# Patient Record
Sex: MALE | Race: OTHER | NOT HISPANIC OR LATINO
[De-identification: names, ages, dates, MRNs, and addresses within clinical notes are randomized per-mention and may not be internally consistent; named-entity substitution may affect disease eponyms.]

---

## 2017-02-08 ENCOUNTER — APPOINTMENT (OUTPATIENT)
Dept: OPHTHALMOLOGY | Facility: CLINIC | Age: 72
End: 2017-02-08

## 2017-02-08 ENCOUNTER — OUTPATIENT (OUTPATIENT)
Dept: OUTPATIENT SERVICES | Facility: HOSPITAL | Age: 72
LOS: 1 days | End: 2017-02-08

## 2017-02-08 DIAGNOSIS — H40.009 PREGLAUCOMA, UNSPECIFIED, UNSPECIFIED EYE: ICD-10-CM

## 2017-05-05 ENCOUNTER — OUTPATIENT (OUTPATIENT)
Dept: OUTPATIENT SERVICES | Facility: HOSPITAL | Age: 72
LOS: 1 days | End: 2017-05-05

## 2017-05-05 ENCOUNTER — APPOINTMENT (OUTPATIENT)
Dept: OPHTHALMOLOGY | Facility: CLINIC | Age: 72
End: 2017-05-05

## 2017-05-05 DIAGNOSIS — H40.009 PREGLAUCOMA, UNSPECIFIED, UNSPECIFIED EYE: ICD-10-CM

## 2017-10-17 ENCOUNTER — APPOINTMENT (OUTPATIENT)
Dept: OPHTHALMOLOGY | Facility: CLINIC | Age: 72
End: 2017-10-17

## 2017-10-24 ENCOUNTER — APPOINTMENT (OUTPATIENT)
Dept: OPHTHALMOLOGY | Facility: CLINIC | Age: 72
End: 2017-10-24

## 2019-09-24 ENCOUNTER — RESULT REVIEW (OUTPATIENT)
Age: 74
End: 2019-09-24

## 2019-09-24 ENCOUNTER — OUTPATIENT (OUTPATIENT)
Dept: OUTPATIENT SERVICES | Facility: HOSPITAL | Age: 74
LOS: 1 days | Discharge: HOME | End: 2019-09-24

## 2019-09-30 LAB — SURGICAL PATHOLOGY STUDY: SIGNIFICANT CHANGE UP

## 2020-01-07 ENCOUNTER — OUTPATIENT (OUTPATIENT)
Dept: OUTPATIENT SERVICES | Facility: HOSPITAL | Age: 75
LOS: 1 days | Discharge: HOME | End: 2020-01-07

## 2021-11-15 ENCOUNTER — APPOINTMENT (OUTPATIENT)
Dept: UROLOGY | Facility: CLINIC | Age: 76
End: 2021-11-15
Payer: MEDICARE

## 2021-11-15 VITALS
HEIGHT: 66 IN | WEIGHT: 160 LBS | DIASTOLIC BLOOD PRESSURE: 87 MMHG | SYSTOLIC BLOOD PRESSURE: 138 MMHG | HEART RATE: 77 BPM | OXYGEN SATURATION: 94 % | BODY MASS INDEX: 25.71 KG/M2 | TEMPERATURE: 98.6 F

## 2021-11-15 DIAGNOSIS — Z63.4 DISAPPEARANCE AND DEATH OF FAMILY MEMBER: ICD-10-CM

## 2021-11-15 DIAGNOSIS — Z87.891 PERSONAL HISTORY OF NICOTINE DEPENDENCE: ICD-10-CM

## 2021-11-15 PROCEDURE — 99204 OFFICE O/P NEW MOD 45 MIN: CPT

## 2021-11-15 SDOH — SOCIAL STABILITY - SOCIAL INSECURITY: DISSAPEARANCE AND DEATH OF FAMILY MEMBER: Z63.4

## 2021-11-16 ENCOUNTER — NON-APPOINTMENT (OUTPATIENT)
Age: 76
End: 2021-11-16

## 2021-11-16 LAB
APPEARANCE: CLEAR
BACTERIA: NEGATIVE
BILIRUBIN URINE: NEGATIVE
BLOOD URINE: NEGATIVE
COLOR: NORMAL
GLUCOSE QUALITATIVE U: NEGATIVE
HYALINE CASTS: 0 /LPF
KETONES URINE: NEGATIVE
LEUKOCYTE ESTERASE URINE: NEGATIVE
MICROSCOPIC-UA: NORMAL
NITRITE URINE: NEGATIVE
PH URINE: 5.5
PROTEIN URINE: NEGATIVE
PSA SERPL-MCNC: 9.68 NG/ML
RED BLOOD CELLS URINE: 1 /HPF
SPECIFIC GRAVITY URINE: 1.02
SQUAMOUS EPITHELIAL CELLS: 0 /HPF
UROBILINOGEN URINE: NORMAL
WHITE BLOOD CELLS URINE: 1 /HPF

## 2021-11-16 NOTE — HISTORY OF PRESENT ILLNESS
[FreeTextEntry1] : Dear Dr. Diehl\par \par Thank you so much for the referral to help care for your patient.\par \par Chief Complaint Elevated PSA \par Date of first visit: 11/15/2021\par \par ALLA MCMILLAN is a 76  year old man with PMHx BPH s/p urolift 2020 and ED s/p IPP who presents for elevated PSA. His PSA is 4.8 ng/mL (8/24/21). He denies prior prostate imaging. States he has had three prior negative biopsies at [facility], unsure of physician's name, but all were reportedly negative. Denies family hx of  malignancies.\par \par Complaining of urgency and some urge incontinence. Not on alpha blockers. Feels as if Urolift did not work.\par \par PSA Hx:\par 4.8 8/24/21\par 5.7 1/18/21\par 7.4 1/4/21\par 0.1 11/3/20\par 5.8 7/15/20\par 6.2 6/27/20\par \par 11/15/2021 \par IPSS NR QOL NR\par SUSANNA NR- IPP \par \par The patient denies fevers, chills, nausea and or vomiting and no unexplained weight loss.\par \par All pertinent laboratory, films and physician notes were reviewed.  Questionnaire results were discussed with patient.

## 2021-11-16 NOTE — PHYSICAL EXAM
[Urethral Meatus] : meatus normal [Penis Abnormality] : normal circumcised penis [Urinary Bladder Findings] : the bladder was normal on palpation [Scrotum] : the scrotum was normal [Testes Tenderness] : no tenderness of the testes [Testes Mass (___cm)] : there were no testicular masses [Prostate Tenderness] : the prostate was not tender [No Prostate Nodules] : no prostate nodules [Prostate Size ___ gm] : prostate size [unfilled] gm [General Appearance - Well Nourished] : well nourished [General Appearance - Well Developed] : well developed [Normal Appearance] : normal appearance [Well Groomed] : well groomed [General Appearance - In No Acute Distress] : no acute distress [Edema] : no peripheral edema [Respiration, Rhythm And Depth] : normal respiratory rhythm and effort [Exaggerated Use Of Accessory Muscles For Inspiration] : no accessory muscle use [Abdomen Soft] : soft [Abdomen Tenderness] : non-tender [Abdomen Hernia] : no hernia was discovered [Costovertebral Angle Tenderness] : no ~M costovertebral angle tenderness [Normal Station and Gait] : the gait and station were normal for the patient's age [] : no rash [No Focal Deficits] : no focal deficits [Oriented To Time, Place, And Person] : oriented to person, place, and time [Affect] : the affect was normal [Mood] : the mood was normal [Not Anxious] : not anxious [No Palpable Adenopathy] : no palpable adenopathy [FreeTextEntry1] : IPP

## 2021-11-16 NOTE — ASSESSMENT
[FreeTextEntry1] : 75 yo male with elevated PSA 4.8 ng/mL, no prior MRI, negative biopsy x3, neg FH, neg RAMAKRISHNA.\par \par 1. PSA\par 2. Urinary urgency- UA, UCx\par 3. Discussed timed voiding to avoid urge incontinence\par 4. MRI at [facility]\par \par Follow up after MRI\par \par Thank you very much for allowing me to assist in the care of this patient. Should you have any additional questions or concerns please do not hesitate to contact me.\par \par \par Sincerely,\par \par \par Ankit Davis D.O.\par  of Urology and Radiology\par  of Urology at [facility]\par System Director for Prostate Cancer\par [address] [place] [address], 5th Floor [facility], [facility]\par Phone: [phone]\par

## 2021-11-17 LAB — BACTERIA UR CULT: NORMAL

## 2021-11-26 ENCOUNTER — OUTPATIENT (OUTPATIENT)
Dept: OUTPATIENT SERVICES | Facility: HOSPITAL | Age: 76
LOS: 1 days | End: 2021-11-26
Payer: MEDICARE

## 2021-11-26 ENCOUNTER — RESULT REVIEW (OUTPATIENT)
Age: 76
End: 2021-11-26

## 2021-11-26 ENCOUNTER — APPOINTMENT (OUTPATIENT)
Dept: MRI IMAGING | Facility: IMAGING CENTER | Age: 76
End: 2021-11-26
Payer: MEDICARE

## 2021-11-26 DIAGNOSIS — R97.20 ELEVATED PROSTATE SPECIFIC ANTIGEN [PSA]: ICD-10-CM

## 2021-11-26 PROCEDURE — 72197 MRI PELVIS W/O & W/DYE: CPT | Mod: 26,MG

## 2021-11-26 PROCEDURE — 76498 UNLISTED MR PROCEDURE: CPT

## 2021-11-26 PROCEDURE — 76498P: CUSTOM | Mod: 26,MH

## 2021-11-26 PROCEDURE — G1004: CPT

## 2021-11-26 PROCEDURE — 72197 MRI PELVIS W/O & W/DYE: CPT | Mod: MG

## 2021-12-01 ENCOUNTER — NON-APPOINTMENT (OUTPATIENT)
Age: 76
End: 2021-12-01

## 2021-12-08 ENCOUNTER — APPOINTMENT (OUTPATIENT)
Dept: UROLOGY | Facility: CLINIC | Age: 76
End: 2021-12-08
Payer: MEDICARE

## 2021-12-08 VITALS
TEMPERATURE: 98.1 F | HEART RATE: 77 BPM | WEIGHT: 160 LBS | HEIGHT: 66 IN | DIASTOLIC BLOOD PRESSURE: 88 MMHG | BODY MASS INDEX: 25.71 KG/M2 | SYSTOLIC BLOOD PRESSURE: 166 MMHG

## 2021-12-08 PROCEDURE — 99214 OFFICE O/P EST MOD 30 MIN: CPT

## 2021-12-08 NOTE — PHYSICAL EXAM
[General Appearance - Well Developed] : well developed [General Appearance - Well Nourished] : well nourished [Normal Appearance] : normal appearance [Well Groomed] : well groomed [General Appearance - In No Acute Distress] : no acute distress [Edema] : no peripheral edema [Respiration, Rhythm And Depth] : normal respiratory rhythm and effort [Exaggerated Use Of Accessory Muscles For Inspiration] : no accessory muscle use [Abdomen Soft] : soft [Abdomen Tenderness] : non-tender [Abdomen Hernia] : no hernia was discovered [Costovertebral Angle Tenderness] : no ~M costovertebral angle tenderness [Urethral Meatus] : meatus normal [Penis Abnormality] : normal circumcised penis [Urinary Bladder Findings] : the bladder was normal on palpation [Scrotum] : the scrotum was normal [Testes Tenderness] : no tenderness of the testes [Testes Mass (___cm)] : there were no testicular masses [Prostate Tenderness] : the prostate was not tender [No Prostate Nodules] : no prostate nodules [Prostate Size ___ gm] : prostate size [unfilled] gm [Normal Station and Gait] : the gait and station were normal for the patient's age [] : no rash [No Focal Deficits] : no focal deficits [Oriented To Time, Place, And Person] : oriented to person, place, and time [Affect] : the affect was normal [Mood] : the mood was normal [Not Anxious] : not anxious [No Palpable Adenopathy] : no palpable adenopathy [FreeTextEntry1] : IPP, neg RAMAKRISHNA 11/16/21

## 2021-12-08 NOTE — ASSESSMENT
[FreeTextEntry1] : 77 yo male with elevated PSA 4.8 ng/mL, no prior MRI, negative biopsy x3, neg FH, neg RAMAKRISHNA.  MRI demonstrated no sig lesions.\par \par The prostate MRI has been reviewed with the patient (images and report).  There are no suspicious lesions on 2nd look.  Reviewing multiple studies the probability of having prostate cancer with a negative prostate MRI is ~ 20%.  However, the probability of having clinically significant disease is <5% of those positive.  A recent study from [facility] presented level 1b evidence that a MRI can help avoid patients biopsies and only misses ~ 3% of clinically significant disease.  \par \par I have discussed these details at length with the patient.  He is aware of the pro's and con's of prostate cancer screening.  Taking into account his PSA, Family History and RAMAKRISHNA findings.  He wishes at this time to avoid a biopsy and will continue to undergo PSA based screening. If the PSA continues to increase or there is increasing clinical suspicion we will repeat MR imaging of the prostate prior to any intervention, due to risks associated with the prostate biopsy. The patient understands that a prostate biopsy is still the standard of care with regards to screening and MRI is an adjunct that can help localize and target more suspicious areas.  In conclusion, he would like to hold off on the biopsy at this time.\par \par 1. discuss with Dr. Diehl about urolift removal due to REJ.  This may not help the problem the patient is aware.\par 2. Urinary urgency no signs of infection - PVR 90 cc - aware of risks of retention, dry mouth.\par 3. Discussed timed voiding to avoid urge incontinence as well as adding oxybutynin 5ER\par 4. MRI at [facility] in 2022 1 year interval if PSA stays elevated.\par 5. 1 month follow up PVr and flow\par \par Thank you very much for allowing me to assist in the care of this patient. Should you have any additional questions or concerns please do not hesitate to contact me.\par \par \par Sincerely,\par \par \par Ankit Davis D.O.\par  of Urology and Radiology\par  of Urology at [facility]\par System Director for Prostate Cancer\par [address], 5[address] Floor [facility], [address]\par Phone: [phone]\par

## 2021-12-09 ENCOUNTER — NON-APPOINTMENT (OUTPATIENT)
Age: 76
End: 2021-12-09

## 2021-12-09 LAB
PSA FREE FLD-MCNC: 12 %
PSA FREE SERPL-MCNC: 0.9 NG/ML
PSA SERPL-MCNC: 7.79 NG/ML

## 2022-01-10 ENCOUNTER — APPOINTMENT (OUTPATIENT)
Dept: UROLOGY | Facility: CLINIC | Age: 77
End: 2022-01-10
Payer: MEDICARE

## 2022-01-10 PROCEDURE — 99213 OFFICE O/P EST LOW 20 MIN: CPT

## 2022-01-10 RX ORDER — OXYBUTYNIN CHLORIDE 5 MG/1
5 TABLET, EXTENDED RELEASE ORAL DAILY
Qty: 90 | Refills: 0 | Status: COMPLETED | COMMUNITY
Start: 2021-12-08 | End: 2022-01-10

## 2022-01-10 NOTE — HISTORY OF PRESENT ILLNESS
[FreeTextEntry1] : Dear Dr. Diehl\par \par Thank you so much for the referral to help care for your patient.\par \par Chief Complaint Urinary Urgency, elevated PSA\par Date of first visit: 11/15/2021\par \par ALLA MCMILLAN is a 76  year old man with PMHx BPH s/p urolift 2020 and ED s/p IPP who presents for elevated PSA and BPH. His PSA is 7.7 ng/mL. MRI on 11/26/21 read as negative but limited due to artifact from urolift. His PSA density is normal 0.10 ng/ml/cc. States he has had three prior negative biopsies at [facility], unsure of physician's name, but all were reportedly negative. Denies family hx of  malignancies.\par \par He is primary focused on REJ associated with his BPH treatment. He reports anejaculation. Does experience sensation of incomplete emptying and varying FOS. He was complaining of urgency and hesitancy at last office visit and was prescribed oxybutynin. He never started and feels these symptoms have subsided. Not on alpha blockers but has tried Flomax in the past.  Feels as if Urolift did not work and he is interested in PAE. Denies hx of recurrent UTI, bladder stones, hematuria, kidney failure. He never started oxybutynin and stopped his prostate supplements. \par \par PSA Hx:\par 7.79 12/8/21. PSAD 0.10 ng/ml/cc\par 9.68 11/15/21\par 4.8 8/24/21\par 5.7 1/18/21\par 7.4 1/4/21\par 0.1 11/3/20\par 5.8 7/15/20\par 6.2 6/27/20\par \par MRI at [facility] on 11/26/2021. 77 cc prostate with no suspicious prostate lesion, PIRADS 1. Artifact from urolift device limits evaluation of periurethral transition zone. No LAD, No EPE, No Bony Lesions. The images have been reviewed and clinical implications discussed with the patient.\par The anterior part of the gland exam was limited due to urolift but DCE demonstrated no abnormal enhancement therefore i do agree with the read.\par \par 01/10/2022\par IPSS 15  QOL 3\par SUSANNA NR\par Flow/PVR: machine malfunction, PVR 29ml\par \par 12/8/2021\par IPSS 19 QOL 6\par SUSANNA\par The PVR was 90 cc.  QMax 8 ml/s VV was 75. \par \par 11/15/2021 \par IPSS NR QOL NR\par SUSANNA NR- IPP \par \par Prostate cancer screening: the patient and I spoke at length about prostate cancer screening, its risks and its benefits. The patient has (Age, PSA) 2 risk factors for prostate cancer.  He understands that many men with prostate cancer will die with the disease rather than of it and we also discussed the results large multi-center American and  prostate cancer screening trials. He also understands that PSA in and of itself does not diagnose prostate cancer but only assesses risk to a certain degree. The patient understands that to definitively screen for prostate cancer, a biopsy is required and this procedure has risks, including bleeding, infection, ED and urinary retention. The patient opted to hold off on an biopsy, trend PSA and reimage at 1 year.\par \par The patient denies fevers, chills, nausea and or vomiting and no unexplained weight loss.\par \par All pertinent laboratory, films and physician notes were reviewed.  Questionnaire results were discussed with patient.\par \par I discussed with the patient and reviewed the risks and benefits and alternatives to prostate artery embolization. Time was spent with the patient discussing alternative therapies including TURP, urolift, medical therapy, laser, HoLEP, SPP and embolization. \par \par We discussed the early result and success of the procedure in general as well as my personal results. Specifically, reviewed risk related to nontarget embolization most commonly involving the bladder and/or rectum. We also spoke about the potential for post procedure obstruction required chase catheter drainage. We reviewed some technical aspects up of the procedure including embolic material utilizing the importance of cone beam CT prior to embolization. The patient wishes procedure scheduling a procedure.\par \par Specific risks of the embolization procedure which were discussed with the patient including infection, bleeding, dysuria, hematuria, hematospermia, non-target embolization which would result in damage to bladder wall leading to ulceration/ischemia, rectal wall injury, and injury to penis and ejaculatory mechanism. These risks are considered to be low. Pelvic pain and burning is not uncommon and considered to be mild and transient. The patient understands that a Chase catheter may be inserted during the procedure to help guide embolization and will be removed at the end of the procedure. A small percentage of patients will have some degree of urinary retention after embolization and will require a catheter to be left in. He understands this. We also discussed that long-term results of prostate embolization are unknown but response rates in reduction of his IPSS score are 80-85% based on current literature and state of the art techniques. \par \par It was explained to the patient that approximate 5% patient experienced some degree bruising following femoral or radial artery catheterization. The hematoma is benign and resolves spontaneously under typical circumstances. The specific benefits and risks of transradial (TR) access versus transfemoral (TF) access were discussed in detail with the patient. This includes decreased risk of bleeding, immediate ambulation and faster discharge time. Potential increased and extremely remote risk of cerebral emboli was discussed. The patient was given information packet with further details.

## 2022-01-10 NOTE — ASSESSMENT
[FreeTextEntry1] : 75 yo male with elevated PSA 7.7 ng/mL, MRI 11/26/21 negative- The anterior part of the gland exam was limited due to urolift but DCE demonstrated no abnormal enhancement therefore i do agree with the read. Negative biopsy x3, neg FH, neg RAMAKRISHNA.  MRI demonstrated no sig lesions. He has failed medical therapy currently on no alpha blockers/supplements and is now interested in PAE before his symptoms worsen.\par \par 3.4mm left radial artery\par Barbau D- needs groin access without a catheter\par \par 1. Book for PAE - GROIN access without a Chase (77 g prostate)\par 2. Preop labs 1/10/22\par 3. Medical clearance with EKG\par 4. MRI at [facility] in 2022 1 year interval if PSA stays elevated. (due 11/2022)\par \par 1 month postop follow up with Flow/PVR\par \par Thank you very much for allowing me to assist in the care of this patient. Should you have any additional questions or concerns please do not hesitate to contact me.\par \par \par Sincerely,\par \par \par Ankit Davis D.O.\par  of Urology and Radiology\par  of Urology at [facility]\par System Director for Prostate Cancer\par [address], 5[address] Floor [facility], [facility]\par Phone: [phone]\par

## 2022-01-10 NOTE — PHYSICAL EXAM

## 2022-01-11 LAB
ANION GAP SERPL CALC-SCNC: 11 MMOL/L
APPEARANCE: CLEAR
BACTERIA: NEGATIVE
BASOPHILS # BLD AUTO: 0.05 K/UL
BASOPHILS NFR BLD AUTO: 0.8 %
BILIRUBIN URINE: NEGATIVE
BLOOD URINE: NEGATIVE
BUN SERPL-MCNC: 11 MG/DL
CALCIUM SERPL-MCNC: 9.3 MG/DL
CHLORIDE SERPL-SCNC: 106 MMOL/L
CO2 SERPL-SCNC: 25 MMOL/L
COLOR: NORMAL
CREAT SERPL-MCNC: 0.96 MG/DL
EOSINOPHIL # BLD AUTO: 0.28 K/UL
EOSINOPHIL NFR BLD AUTO: 4.5 %
GLUCOSE QUALITATIVE U: NEGATIVE
GLUCOSE SERPL-MCNC: 90 MG/DL
HCT VFR BLD CALC: 46.8 %
HGB BLD-MCNC: 14.8 G/DL
HYALINE CASTS: 1 /LPF
IMM GRANULOCYTES NFR BLD AUTO: 0.3 %
KETONES URINE: NEGATIVE
LEUKOCYTE ESTERASE URINE: NEGATIVE
LYMPHOCYTES # BLD AUTO: 1.86 K/UL
LYMPHOCYTES NFR BLD AUTO: 30.1 %
MAN DIFF?: NORMAL
MCHC RBC-ENTMCNC: 29.1 PG
MCHC RBC-ENTMCNC: 31.6 GM/DL
MCV RBC AUTO: 92.1 FL
MICROSCOPIC-UA: NORMAL
MONOCYTES # BLD AUTO: 0.58 K/UL
MONOCYTES NFR BLD AUTO: 9.4 %
NEUTROPHILS # BLD AUTO: 3.39 K/UL
NEUTROPHILS NFR BLD AUTO: 54.9 %
NITRITE URINE: NEGATIVE
PH URINE: 6
PLATELET # BLD AUTO: 251 K/UL
POTASSIUM SERPL-SCNC: 4.4 MMOL/L
PROTEIN URINE: NEGATIVE
RBC # BLD: 5.08 M/UL
RBC # FLD: 13.9 %
RED BLOOD CELLS URINE: 2 /HPF
SODIUM SERPL-SCNC: 142 MMOL/L
SPECIFIC GRAVITY URINE: 1.01
SQUAMOUS EPITHELIAL CELLS: 0 /HPF
UROBILINOGEN URINE: NORMAL
WBC # FLD AUTO: 6.18 K/UL
WHITE BLOOD CELLS URINE: 1 /HPF

## 2022-01-13 LAB — BACTERIA UR CULT: NORMAL

## 2022-01-20 ENCOUNTER — NON-APPOINTMENT (OUTPATIENT)
Age: 77
End: 2022-01-20

## 2022-01-25 ENCOUNTER — APPOINTMENT (OUTPATIENT)
Dept: INTERVENTIONAL RADIOLOGY/VASCULAR | Facility: HOSPITAL | Age: 77
End: 2022-01-25

## 2022-01-25 ENCOUNTER — RESULT REVIEW (OUTPATIENT)
Age: 77
End: 2022-01-25

## 2022-01-25 ENCOUNTER — APPOINTMENT (OUTPATIENT)
Dept: UROLOGY | Facility: HOSPITAL | Age: 77
End: 2022-01-25

## 2022-01-25 ENCOUNTER — OUTPATIENT (OUTPATIENT)
Dept: OUTPATIENT SERVICES | Facility: HOSPITAL | Age: 77
LOS: 1 days | End: 2022-01-25
Payer: MEDICARE

## 2022-01-25 PROCEDURE — C1894: CPT

## 2022-01-25 PROCEDURE — 76380 CAT SCAN FOLLOW-UP STUDY: CPT | Mod: 59

## 2022-01-25 PROCEDURE — 76377 3D RENDER W/INTRP POSTPROCES: CPT

## 2022-01-25 PROCEDURE — 76377 3D RENDER W/INTRP POSTPROCES: CPT | Mod: 26

## 2022-01-25 PROCEDURE — C1760: CPT

## 2022-01-25 PROCEDURE — 76937 US GUIDE VASCULAR ACCESS: CPT

## 2022-01-25 PROCEDURE — 37243 VASC EMBOLIZE/OCCLUDE ORGAN: CPT

## 2022-01-25 PROCEDURE — 76937 US GUIDE VASCULAR ACCESS: CPT | Mod: 26

## 2022-01-25 PROCEDURE — 36247 INS CATH ABD/L-EXT ART 3RD: CPT

## 2022-01-25 PROCEDURE — C1889: CPT

## 2022-01-25 PROCEDURE — C1887: CPT

## 2022-01-25 PROCEDURE — C1769: CPT

## 2022-01-25 PROCEDURE — 76380 CAT SCAN FOLLOW-UP STUDY: CPT | Mod: 26,59,MH

## 2022-01-26 ENCOUNTER — NON-APPOINTMENT (OUTPATIENT)
Age: 77
End: 2022-01-26

## 2022-02-01 ENCOUNTER — NON-APPOINTMENT (OUTPATIENT)
Age: 77
End: 2022-02-01

## 2022-02-14 ENCOUNTER — APPOINTMENT (OUTPATIENT)
Dept: UROLOGY | Facility: CLINIC | Age: 77
End: 2022-02-14
Payer: MEDICARE

## 2022-02-14 VITALS — DIASTOLIC BLOOD PRESSURE: 92 MMHG | HEART RATE: 66 BPM | SYSTOLIC BLOOD PRESSURE: 159 MMHG | TEMPERATURE: 97.4 F

## 2022-02-14 PROCEDURE — 99213 OFFICE O/P EST LOW 20 MIN: CPT

## 2022-02-14 NOTE — PHYSICAL EXAM

## 2022-02-14 NOTE — HISTORY OF PRESENT ILLNESS
[FreeTextEntry1] : Dear Dr. Diehl\par \par Thank you so much for the referral to help care for your patient.\par \par Chief Complaint: BPH, elevated PSA\par Date of first visit: 11/15/2021\par \par ALLA MCMILLAN is a 76  year old man with PMHx BPH s/p urolift 2020 and ED s/p IPP who presents for elevated PSA and BPH. His PSA is 7.7 ng/mL. MRI on 11/26/21 read as negative but limited due to artifact from urolift. His PSA density is normal 0.10 ng/ml/cc. States he has had three prior negative biopsies at [facility], unsure of physician's name, but all were reportedly negative. Denies family hx of  malignancies.\par \par He is s/p bilateral PAE with coils on 1/25/22. He is happy but still complaining of sensation of incomplete emptying/double voiding. Urgency/urge incontinence has resolved and he is happy even though only 3 weeks postop. \par \par He is primary focused on REJ associated with his BPH treatment. He reports anejaculation. Does experience sensation of incomplete emptying and varying FOS. He was complaining of urgency and hesitancy at last office visit and was prescribed oxybutynin. He never started and feels these symptoms have subsided. Not on alpha blockers but has tried Flomax in the past.  Feels as if Urolift did not work and he is interested in PAE. Denies hx of recurrent UTI, bladder stones, hematuria, kidney failure. He never started oxybutynin and stopped his prostate supplements. \par \par PSA Hx:\par 7.79 12/8/21. PSAD 0.10 ng/ml/cc\par 9.68 11/15/21\par 4.8 8/24/21\par 5.7 1/18/21\par 7.4 1/4/21\par 0.1 11/3/20\par 5.8 7/15/20\par 6.2 6/27/20\par \par MRI at [facility] on 11/26/2021. 77 cc prostate with no suspicious prostate lesion, PIRADS 1. Artifact from urolift device limits evaluation of periurethral transition zone. No LAD, No EPE, No Bony Lesions. The images have been reviewed and clinical implications discussed with the patient.\par The anterior part of the gland exam was limited due to urolift but DCE demonstrated no abnormal enhancement therefore i do agree with the read.\par \par 02/14/2022\par IPSS  10 QOL 3\par SUSANNA NSA\par Flow/PVR: peak 8.8 ml/s, avg 5.6 ml/s, vv 63 ml-not valid, PVR 83\par \par 01/10/2022\par IPSS 15  QOL 3\par SUSANNA NR\par Flow/PVR: machine malfunction, PVR 29ml\par \par 12/8/2021\par IPSS 19 QOL 6\par SUSANNA\par The PVR was 90 cc.  QMax 8 ml/s VV was 75. \par \par 11/15/2021 \par IPSS NR QOL NR\par SUSANNA NR- IPP \par \par Prostate cancer screening: the patient and I spoke at length about prostate cancer screening, its risks and its benefits. The patient has (Age, PSA) 2 risk factors for prostate cancer.  He understands that many men with prostate cancer will die with the disease rather than of it and we also discussed the results large multi-center American and  prostate cancer screening trials. He also understands that PSA in and of itself does not diagnose prostate cancer but only assesses risk to a certain degree. The patient understands that to definitively screen for prostate cancer, a biopsy is required and this procedure has risks, including bleeding, infection, ED and urinary retention. The patient opted to hold off on an biopsy, trend PSA and reimage at 1 year.\par \par The patient denies fevers, chills, nausea and or vomiting and no unexplained weight loss.\par \par All pertinent laboratory, films and physician notes were reviewed.  Questionnaire results were discussed with patient.\par

## 2022-05-09 ENCOUNTER — APPOINTMENT (OUTPATIENT)
Dept: UROLOGY | Facility: CLINIC | Age: 77
End: 2022-05-09
Payer: MEDICARE

## 2022-05-09 VITALS
OXYGEN SATURATION: 94 % | DIASTOLIC BLOOD PRESSURE: 78 MMHG | HEART RATE: 70 BPM | SYSTOLIC BLOOD PRESSURE: 154 MMHG | TEMPERATURE: 98 F

## 2022-05-09 PROCEDURE — 99213 OFFICE O/P EST LOW 20 MIN: CPT

## 2022-05-09 NOTE — HISTORY OF PRESENT ILLNESS
[FreeTextEntry1] : Dear Dr. Diehl\par \par Thank you so much for the referral to help care for your patient.\par \par Chief Complaint: BPH, elevated PSA\par Date of first visit: 11/15/2021\par \par ALLA MCMILLAN is a 77 year old man with PMHx BPH s/p urolift 2020 and ED s/p IPP who presents for evaluation of elevated PSA and BPH. His PSA was 7.7 ng/mL(12/2021). MRI on 11/26/21 read as negative but limited due to artifact from urolift. His PSA density is normal (0.10 ng/ml/cc). States he has had three prior negative biopsies at [facility], unsure of physician's name, but all were reportedly negative. Denies family hx of  malignancies.\par \par He is doing well s/p bilateral PAE with coils 01/2022, states the procedure "was a miracle". \par Denies frequency and urgency. He does have the occasional sensation of incomplete emptying and occasional need for double voiding. Nocturia x 1-2. He denies hematuria and dysuria. \par \par PSA Hx:\par 7.79 12/8/21. PSAD 0.10 ng/ml/cc\par 9.68 11/15/21\par 4.8 8/24/21\par 5.7 1/18/21\par 7.4 1/4/21\par 0.1 11/3/20\par 5.8 7/15/20\par 6.2 6/27/20\par \par MRI at [facility] on 11/26/2021. 77 cc prostate with no suspicious prostate lesion, PIRADS 1. Artifact from urolift device limits evaluation of periurethral transition zone. No LAD, No EPE, No Bony Lesions. The images have been reviewed and clinical implications discussed with the patient.\par The anterior part of the gland exam was limited due to urolift but DCE demonstrated no abnormal enhancement therefore i do agree with the read.\par \par 05/09/2022\par IPSS 4   QOL 1\par SUSANNA NR\par FlowPVR- VV insufficient- 11.8 ml PVR= 69 ml\par \par 02/14/2022\par IPSS  10 QOL 3\par SUSANNA NSA\par Flow/PVR: peak 8.8 ml/s, avg 5.6 ml/s, vv 63 ml-not valid, PVR 83\par \par 01/10/2022\par IPSS 15  QOL 3\par SUSANNA NR\par Flow/PVR: machine malfunction, PVR 29ml\par \par 12/8/2021\par IPSS 19 QOL 6\par SUSANNA\par The PVR was 90 cc.  QMax 8 ml/s VV was 75. \par \par 11/15/2021 \par IPSS NR QOL NR\par SUSANNA NR- IPP \par \par Prostate cancer screening: the patient and I spoke at length about prostate cancer screening, its risks and its benefits. The patient has (Age, PSA) 2 risk factors for prostate cancer.  He understands that many men with prostate cancer will die with the disease rather than of it and we also discussed the results large multi-center American and  prostate cancer screening trials. He also understands that PSA in and of itself does not diagnose prostate cancer but only assesses risk to a certain degree. The patient understands that to definitively screen for prostate cancer, a biopsy is required and this procedure has risks, including bleeding, infection, ED and urinary retention. The patient opted to trend PSA yearly.\par \par The patient denies fevers, chills, nausea and or vomiting and no unexplained weight loss.\par \par All pertinent laboratory results and physician notes were reviewed.  Questionnaire results were discussed with patient.\par

## 2022-05-09 NOTE — ASSESSMENT
[FreeTextEntry1] : 77 year old male 4 months s/p bilateral PAE with coils. He has an elevated PSA 7.7 ng/mL, MRI 11/26/21 negative- The anterior part of the gland exam was limited due to urolift but DCE demonstrated no abnormal enhancement therefore i do agree with the read. Normal PSAD, negative biopsy x3, neg FH, neg RAMAKRISHNA. MRI demonstrated no sig lesions. Progressing well post op.\par \par 1. RTC in 6 months with FlowPVR\par 2. IPSS 10 --> , PVR stable, 69 ml\par 3. MRI at [facility] due 11/2022\par \par \par Thank you very much for allowing me to assist in the care of this patient. Should you have any additional questions or concerns please do not hesitate to contact me.\par \par \par Sincerely,\par \par \par Ankit Davis D.O.\par  of Urology and Radiology\par  of Urology at [facility]\par System Director for Prostate Cancer\par [address] [place] [address], 5th Floor [facility], [facility]\par Phone: [phone]\par

## 2022-05-09 NOTE — ADDENDUM
[FreeTextEntry1] : I, Dr. Davis, personally performed the evaluation and management (E/M) services for this established patient who presents today with (a) new problem(s)/exacerbation of (an) existing condition(s).  That E/M includes conducting the examination, assessing all new/exacerbated conditions, and establishing a new plan of care.  Today, my ACP, Pam Alford NP, was here to observe my evaluation and management services for this new problem/exacerbated condition to be followed going forward\par

## 2022-05-11 ENCOUNTER — APPOINTMENT (OUTPATIENT)
Dept: UROLOGY | Facility: CLINIC | Age: 77
End: 2022-05-11

## 2022-11-14 ENCOUNTER — APPOINTMENT (OUTPATIENT)
Dept: UROLOGY | Facility: CLINIC | Age: 77
End: 2022-11-14

## 2022-11-14 VITALS — HEART RATE: 69 BPM | SYSTOLIC BLOOD PRESSURE: 123 MMHG | DIASTOLIC BLOOD PRESSURE: 54 MMHG | TEMPERATURE: 98 F

## 2022-11-14 PROCEDURE — 99213 OFFICE O/P EST LOW 20 MIN: CPT

## 2022-11-14 NOTE — ASSESSMENT
[FreeTextEntry1] : 77 year old male 4 months s/p bilateral PAE with coils. He has an elevated PSA 7.7 ng/mL, MRI 11/26/21 negative- The anterior part of the gland exam was limited due to urolift but DCE demonstrated no abnormal enhancement therefore i do agree with the read. Normal PSAD, negative biopsy x3, neg FH, neg RAMAKRISHNA. MRI demonstrated no sig lesions. Progressing well post op.\par \par Elevated PSA\par - PSA today\par - Prostate MRI ordered\par - RTC Dec 13th to review MRI \par \par BPH/LUTS s/p Urolift (2020) and then b/l PAE (2022)\par -  Flow/PVR in 6 months\par - Pt is currently happy with his voiding sx and does not wish to start any medication due to apprehension regarding side effects\par \par Erectile Dysfunction/Decrease libido\par - IPP is functioning well\par - Will check Testosterone today - if returns low, would recommend drawn AM level before initiating any treatment\par \par Thank you very much for allowing me to assist in the care of this patient. Should you have any additional questions or concerns please do not hesitate to contact me.\par \par \par Sincerely,\par \par \par Ankit Davis D.O.\par  of Urology and Radiology\par  of Urology at [facility]\par System Director for Prostate Cancer\par [address], 5[address] Floor [facility], [facility]\par Phone: [phone]\par

## 2022-11-14 NOTE — PHYSICAL EXAM
[General Appearance - Well Developed] : well developed [General Appearance - Well Nourished] : well nourished [Normal Appearance] : normal appearance [Well Groomed] : well groomed [General Appearance - In No Acute Distress] : no acute distress [Abdomen Soft] : soft [Abdomen Tenderness] : non-tender [Abdomen Mass (___ Cm)] : no abdominal mass palpated [Costovertebral Angle Tenderness] : no ~M costovertebral angle tenderness [Urethral Meatus] : meatus normal [Penis Abnormality] : normal circumcised penis [Scrotum] : the scrotum was normal [Testes Tenderness] : no tenderness of the testes [Testes Mass (___cm)] : there were no testicular masses [Prostate Enlargement] : the prostate was not enlarged [Prostate Tenderness] : the prostate was not tender [Skin Color & Pigmentation] : normal skin color and pigmentation [Edema] : no peripheral edema [] : no respiratory distress [Exaggerated Use Of Accessory Muscles For Inspiration] : no accessory muscle use [Oriented To Time, Place, And Person] : oriented to person, place, and time [Affect] : the affect was normal [Mood] : the mood was normal [Not Anxious] : not anxious [Normal Station and Gait] : the gait and station were normal for the patient's age [No Focal Deficits] : no focal deficits [FreeTextEntry1] : Deflated IPP palpated, no erosion noted.  RAMAKRISHNA 11/14/22 R-sided firmness, but not nodules

## 2022-11-14 NOTE — HISTORY OF PRESENT ILLNESS
[FreeTextEntry1] : Dear Dr. Diehl\par \par Thank you so much for the referral to help care for your patient.\par \par Chief Complaint: BPH, elevated PSA\par Date of first visit: 11/15/2021\par \par ALLA MCMILLAN is a 77 year old man with PMHx BPH s/p urolift 2020 and ED s/p IPP who presents for evaluation of elevated PSA and BPH. His PSA was 7.7 ng/mL(12/2021). MRI on 11/26/21 read as negative but limited due to artifact from urolift. His PSA density is normal (0.10 ng/ml/cc). States he has had three prior negative biopsies at [facility], unsure of physician's name, but all were reportedly negative. Denies family hx of  malignancies.\par \par He is doing well s/p bilateral PAE with coils 01/2022, states the procedure "was a miracle". \par Denies frequency and urgency. He does have the occasional sensation of incomplete emptying and occasional need for double voiding. Nocturia x 1-2. He denies hematuria and dysuria. Made some behavioral modifications (decreased caffeine and alcohol) and has noticed improvement in symptoms.  He also reports non-specific fatigue and decreased libido.  \par \par PSA Hx:\par 7.79 12/8/21. PSAD 0.10 ng/ml/cc\par 9.68 11/15/21\par 4.8 8/24/21\par 5.7 1/18/21\par 7.4 1/4/21\par 0.1 11/3/20\par 5.8 7/15/20\par 6.2 6/27/20\par \par MRI at [facility] on 11/26/2021. 77 cc prostate with no suspicious prostate lesion, PIRADS 1. Artifact from urolift device limits evaluation of periurethral transition zone. No LAD, No EPE, No Bony Lesions. The images have been reviewed and clinical implications discussed with the patient.\par The anterior part of the gland exam was limited due to urolift but DCE demonstrated no abnormal enhancement therefore i do agree with the read.\par \par 11/14/2022\par IPSS 9  QOL 2\par SUSANNA n/a has IPP\par Flow/PVR: Qmax 9.7ml/s, avg 5.2 ml/s, vv 62.4cc, PVR 8cc\par \par 05/09/2022\par IPSS 4   QOL 1\par SUSANNA NR\par FlowPVR- VV insufficient- 11.8 ml PVR= 69 ml\par \par 02/14/2022-POST PAE\par IPSS  10 QOL 3\par SUSANNA NSA\par Flow/PVR: peak 8.8 ml/s, avg 5.6 ml/s, vv 63 ml-not valid, PVR 83\par \par 01/10/2022-PRE PAE\par IPSS 15  QOL 3\par SUSANNA NR\par Flow/PVR: machine malfunction, PVR 29ml\par \par 12/8/2021\par IPSS 19 QOL 6\par SUSANNA\par The PVR was 90 cc.  QMax 8 ml/s VV was 75. \par \par 11/15/2021 \par IPSS NR QOL NR\par SUSANNA NR- IPP \par \par Prostate cancer screening: the patient and I spoke at length about prostate cancer screening, its risks and its benefits. The patient has (Age, PSA) 2 risk factors for prostate cancer.  He understands that many men with prostate cancer will die with the disease rather than of it and we also discussed the results large multi-center American and  prostate cancer screening trials. He also understands that PSA in and of itself does not diagnose prostate cancer but only assesses risk to a certain degree. The patient understands that to definitively screen for prostate cancer, a biopsy is required and this procedure has risks, including bleeding, infection, ED and urinary retention. The patient opted to trend PSA yearly.\par \par The patient denies fevers, chills, nausea and or vomiting and no unexplained weight loss.\par \par All pertinent laboratory results and physician notes were reviewed.  Questionnaire results were discussed with patient.\par

## 2022-11-15 ENCOUNTER — NON-APPOINTMENT (OUTPATIENT)
Age: 77
End: 2022-11-15

## 2022-11-15 LAB — PSA SERPL-MCNC: 1.56 NG/ML

## 2022-11-18 LAB
TESTOST FREE SERPL-MCNC: 9.9 PG/ML
TESTOST SERPL-MCNC: 525 NG/DL

## 2022-12-05 ENCOUNTER — OUTPATIENT (OUTPATIENT)
Dept: OUTPATIENT SERVICES | Facility: HOSPITAL | Age: 77
LOS: 1 days | End: 2022-12-05
Payer: MEDICARE

## 2022-12-05 ENCOUNTER — RESULT REVIEW (OUTPATIENT)
Age: 77
End: 2022-12-05

## 2022-12-05 ENCOUNTER — APPOINTMENT (OUTPATIENT)
Dept: MRI IMAGING | Facility: IMAGING CENTER | Age: 77
End: 2022-12-05
Payer: MEDICARE

## 2022-12-05 DIAGNOSIS — N40.1 BENIGN PROSTATIC HYPERPLASIA WITH LOWER URINARY TRACT SYMPTOMS: ICD-10-CM

## 2022-12-05 PROCEDURE — 76498P: CUSTOM | Mod: 26,MH

## 2022-12-05 PROCEDURE — 76498 UNLISTED MR PROCEDURE: CPT

## 2022-12-05 PROCEDURE — A9585: CPT

## 2022-12-05 PROCEDURE — 72197 MRI PELVIS W/O & W/DYE: CPT

## 2022-12-05 PROCEDURE — 72197 MRI PELVIS W/O & W/DYE: CPT | Mod: 26,MH

## 2022-12-08 ENCOUNTER — NON-APPOINTMENT (OUTPATIENT)
Age: 77
End: 2022-12-08

## 2022-12-13 ENCOUNTER — APPOINTMENT (OUTPATIENT)
Dept: UROLOGY | Facility: CLINIC | Age: 77
End: 2022-12-13

## 2022-12-13 VITALS
HEIGHT: 66 IN | HEART RATE: 58 BPM | RESPIRATION RATE: 17 BRPM | SYSTOLIC BLOOD PRESSURE: 156 MMHG | DIASTOLIC BLOOD PRESSURE: 82 MMHG | BODY MASS INDEX: 25.71 KG/M2 | TEMPERATURE: 98.3 F | WEIGHT: 160 LBS

## 2022-12-13 PROCEDURE — 99213 OFFICE O/P EST LOW 20 MIN: CPT

## 2022-12-13 NOTE — ASSESSMENT
[FreeTextEntry1] : 77 year old male 4 months s/p bilateral PAE with coils. He has an elevated PSA 7.7 ng/mL, MRI 11/26/21 negative- The anterior part of the gland exam was limited due to urolift but DCE demonstrated no abnormal enhancement therefore i do agree with the read. Normal PSAD, negative biopsy x3, neg FH, neg RAMAKRISHNA. MRI demonstrated no sig lesions. Progressing well post op.\par \par Elevated PSA\par - PSA in 6 months\par - Prostate MRI in 1845-6412\par \par BPH/LUTS s/p Urolift (2020) and then b/l PAE (2022)\par -  Flow/PVR in 6 months\par - Pt is currently happy with his voiding sx and does not wish to start any medication due to apprehension regarding side effects\par \par Erectile Dysfunction/Decrease libido\par - IPP is functioning well\par - Will check Testosterone today - if returns low, would recommend drawn AM level before initiating any treatment\par \par Thank you very much for allowing me to assist in the care of this patient. Should you have any additional questions or concerns please do not hesitate to contact me.\par \par \par Sincerely,\par \par \par Ankit Davis D.O.\par  of Urology and Radiology\par  of Urology at [facility]\par System Director for Prostate Cancer\par [address] [place] [address], 5th Floor [facility], [facility]\par Phone: [phone]\par

## 2022-12-13 NOTE — HISTORY OF PRESENT ILLNESS
[FreeTextEntry1] : Dear Dr. Diehl, \par Thank you so much for the referral to help care for your patient.\par \par Chief Complaint: BPH, elevated PSA\par Date of first visit: 11/15/2021\par \par ALLA MCMILLAN is a 77 year old man with PMHx BPH s/p urolift 2020 and ED s/p IPP who presents for evaluation of elevated PSA and BPH. His PSA was 7.7 ng/mL(12/2021). MRI on 11/26/21 read as negative but limited due to artifact from urolift. His PSA density is normal (0.10 ng/ml/cc). States he has had three prior negative biopsies at [facility], unsure of physician's name, but all were reportedly negative. Denies family hx of  malignancies.\par \par He is doing well s/p bilateral PAE with coils 01/2022, states the procedure "was a miracle". \par Denies frequency and urgency. He does have the occasional sensation of incomplete emptying and occasional need for double voiding. Nocturia x 1-2. He denies hematuria and dysuria. Made some behavioral modifications (decreased caffeine and alcohol) and has noticed improvement in symptoms.  He also reports non-specific fatigue and decreased libido.  \par \par PSA Hx:\par 1.56  11/14/22\par 7.79  12/08/21. PSAD 0.10 ng/ml/cc\par 9.68  11/15/21\par 4.8     8/24/21\par 5.7     1/18/21\par 7.4     1/04/21\par 0.1     11/3/20\par 5.8     7/15/20\par 6.2     6/27/20\par \par MRI Hx:\par MRI read 12/05/2022. 76 ml prostate with PIRADS 1 no MRI targetable lesions. No LAD No EPE, No Bony Lesions.  Penile prosthesis with left pelvic reservoir. No significant change to prostate gland size. Small focus of expected infarction in the left transition zone post PAE. The images have been reviewed and clinical implications discussed with the patient.\par \par MRI at [facility] on 11/26/2021. 77 cc prostate with no suspicious prostate lesion, PIRADS 1. Artifact from urolift device limits evaluation of periurethral transition zone. No LAD, No EPE, No Bony Lesions. The images have been reviewed and clinical implications discussed with the patient.\par The anterior part of the gland exam was limited due to urolift but DCE demonstrated no abnormal enhancement therefore i do agree with the read.\par \par 12/13/2022\par IPSS 5   QOL 2\par SUSANNA - not recorded, has IPP\par Flow: Qmax 8.6 ml/s, avg 3.5 ml/s, vv 67cc\par PVR: 111cc\par \par 11/14/2022\par IPSS 9  QOL 2\par SUSANNA n/a has IPP\par Flow/PVR: Qmax 9.7ml/s, avg 5.2 ml/s, vv 62.4cc, PVR 8cc\par \par 05/09/2022\par IPSS 4   QOL 1\par SUSANNA NR\par FlowPVR- VV insufficient- 11.8 ml PVR= 69 ml\par \par 02/14/2022-POST PAE\par IPSS  10 QOL 3\par SUSANNA NSA\par Flow/PVR: peak 8.8 ml/s, avg 5.6 ml/s, vv 63 ml-not valid, PVR 83\par \par 01/10/2022-PRE PAE\par IPSS 15  QOL 3\par SUSANNA NR\par Flow/PVR: machine malfunction, PVR 29ml\par \par 12/8/2021\par IPSS 19 QOL 6\par SUSANNA\par The PVR was 90 cc.  QMax 8 ml/s VV was 75. \par \par 11/15/2021 \par IPSS NR QOL NR\par SUSANNA NR- IPP \par \par Prostate cancer screening: the patient and I spoke at length about prostate cancer screening, its risks and its benefits. The patient has (Age, PSA) 2 risk factors for prostate cancer.  He understands that many men with prostate cancer will die with the disease rather than of it and we also discussed the results large multi-center American and  prostate cancer screening trials. He also understands that PSA in and of itself does not diagnose prostate cancer but only assesses risk to a certain degree. The patient understands that to definitively screen for prostate cancer, a biopsy is required and this procedure has risks, including bleeding, infection, ED and urinary retention. The patient opted to trend PSA yearly.\par \par The patient denies fevers, chills, nausea and or vomiting and no unexplained weight loss.\par \par All pertinent laboratory results and physician notes were reviewed.  Questionnaire results were discussed with patient.\par

## 2022-12-13 NOTE — PHYSICAL EXAM
[General Appearance - Well Developed] : well developed [General Appearance - Well Nourished] : well nourished [Normal Appearance] : normal appearance [Well Groomed] : well groomed [General Appearance - In No Acute Distress] : no acute distress [Abdomen Soft] : soft [Abdomen Tenderness] : non-tender [Abdomen Mass (___ Cm)] : no abdominal mass palpated [Skin Color & Pigmentation] : normal skin color and pigmentation [Edema] : no peripheral edema [] : no respiratory distress [Exaggerated Use Of Accessory Muscles For Inspiration] : no accessory muscle use [Oriented To Time, Place, And Person] : oriented to person, place, and time [Affect] : the affect was normal [Mood] : the mood was normal [Not Anxious] : not anxious [Normal Station and Gait] : the gait and station were normal for the patient's age [No Focal Deficits] : no focal deficits

## 2023-06-13 ENCOUNTER — APPOINTMENT (OUTPATIENT)
Dept: UROLOGY | Facility: CLINIC | Age: 78
End: 2023-06-13
Payer: MEDICARE

## 2023-06-13 VITALS
HEART RATE: 50 BPM | DIASTOLIC BLOOD PRESSURE: 79 MMHG | SYSTOLIC BLOOD PRESSURE: 138 MMHG | WEIGHT: 155 LBS | HEIGHT: 66 IN | RESPIRATION RATE: 16 BRPM | BODY MASS INDEX: 24.91 KG/M2

## 2023-06-13 PROCEDURE — 99213 OFFICE O/P EST LOW 20 MIN: CPT

## 2023-06-13 NOTE — ASSESSMENT
[FreeTextEntry1] : 78 year old male is s/p bilateral PAE with coils. He has an elevated PSA 7.7 ng/mL, MRI 11/26/21 negative- The anterior part of the gland exam was limited due to urolift but DCE demonstrated no abnormal enhancement therefore i do agree with the read. Normal PSAD, negative biopsy x3, neg FH, neg RAMAKRISHNA. MRI demonstrated no sig lesions. Progressing well post op.\par \par Elevated PSA\par - PSA in 6 months (due May 2023) \par - Prostate MRI in 0168-4403\par \par BPH/LUTS s/p Urolift (2020) and then b/l PAE (2022)\par -  Flow/PVR at next visit \par - Pt is currently happy with his voiding sx and does not wish to start any medication due to apprehension regarding side effects\par \par Erectile Dysfunction/Decrease libido\par - IPP is functioning well\par - Testosterone normal on Nov 2022 check \par \par Thank you very much for allowing me to assist in the care of this patient. Should you have any additional questions or concerns please do not hesitate to contact me.\par \par \par Sincerely,\par \par \par Ankit Dvais D.O.\par Professor of Urology and Radiology\par  of Urology at [facility]\par System Director for Prostate Cancer\par [address] [place] [address], 5th Floor [facility], [facility]\par Phone: [phone]

## 2023-06-13 NOTE — PHYSICAL EXAM
[General Appearance - Well Developed] : well developed [General Appearance - Well Nourished] : well nourished [Normal Appearance] : normal appearance [Well Groomed] : well groomed [General Appearance - In No Acute Distress] : no acute distress [Abdomen Soft] : soft [Abdomen Tenderness] : non-tender [Abdomen Mass (___ Cm)] : no abdominal mass palpated [Skin Color & Pigmentation] : normal skin color and pigmentation [Skin Lesions] : no skin lesions [Edema] : no peripheral edema [] : no respiratory distress [Exaggerated Use Of Accessory Muscles For Inspiration] : no accessory muscle use [Oriented To Time, Place, And Person] : oriented to person, place, and time [Affect] : the affect was normal [Mood] : the mood was normal [Not Anxious] : not anxious [Normal Station and Gait] : the gait and station were normal for the patient's age [No Focal Deficits] : no focal deficits

## 2023-06-13 NOTE — HISTORY OF PRESENT ILLNESS
[FreeTextEntry1] : Dear Dr. Diehl, \par Thank you so much for the referral to help care for your patient.\par \par Chief Complaint: BPH, elevated PSA\par Date of first visit: 11/15/2021\par \par ALLA MCMILLAN is a 78 year old [place] man with PMHx BPH s/p urolift 2020 and ED s/p IPP who presents for evaluation of elevated PSA and BPH. His PSA was 7.7 ng/mL(12/2021). MRI on 11/26/21 read as negative but limited due to artifact from urolift. His PSA density is normal (0.10 ng/ml/cc). States he has had three prior negative biopsies at [facility], unsure of physician's name, but all were reportedly negative. Denies family hx of  malignancies.\par \par He is doing well s/p bilateral PAE with coils 01/2022, states the procedure "was a miracle". \par Denies frequency and urgency. He does have the occasional sensation of incomplete emptying and occasional need for double voiding. States his LUTS complaints are only after drinking alcohol.  Nocturia x 1-2. He denies hematuria and dysuria. Made some behavioral modifications (decreased caffeine and alcohol) and has noticed improvement in symptoms.  He also reports non-specific fatigue and decreased libido.  \par \par PSA Hx:\par 1.56  11/14/22\par 7.79  12/08/21. PSAD 0.10 ng/ml/cc\par 9.68  11/15/21\par 4.8     8/24/21\par 5.7     1/18/21\par 7.4     1/04/21\par 0.1     11/3/20\par 5.8     7/15/20\par 6.2     6/27/20\par \par MRI Hx:\par MRI read 12/05/2022. 76 ml prostate with PIRADS 1 no MRI targetable lesions. No LAD No EPE, No Bony Lesions.  Penile prosthesis with left pelvic reservoir. No significant change to prostate gland size. Small focus of expected infarction in the left transition zone post PAE. The images have been reviewed and clinical implications discussed with the patient.\par \par MRI at [facility] on 11/26/2021. 77 cc prostate with no suspicious prostate lesion, PIRADS 1. Artifact from urolift device limits evaluation of periurethral transition zone. No LAD, No EPE, No Bony Lesions. The images have been reviewed and clinical implications discussed with the patient.\par The anterior part of the gland exam was limited due to urolift but DCE demonstrated no abnormal enhancement therefore i do agree with the read.\par \par 06/13/2023\par IPSS 6    QOL 3\par SUSANNA not sexually active\par Flow/PVR: unable to void, PVR 98cc\par \par 12/13/2022\par IPSS 5   QOL 2\par SUSANNA - not recorded, has IPP\par Flow: Qmax 8.6 ml/s, avg 3.5 ml/s, vv 67cc\par PVR: 111cc\par \par 11/14/2022\par IPSS 9  QOL 2\par SUSANNA n/a has IPP\par Flow/PVR: Qmax 9.7ml/s, avg 5.2 ml/s, vv 62.4cc, PVR 8cc\par \par 05/09/2022\par IPSS 4   QOL 1\par SUSANNA NR\par FlowPVR- VV insufficient- 11.8 ml PVR= 69 ml\par \par 02/14/2022-POST PAE\par IPSS  10 QOL 3\par SUSANNA NSA\par Flow/PVR: peak 8.8 ml/s, avg 5.6 ml/s, vv 63 ml-not valid, PVR 83\par \par 01/10/2022-PRE PAE\par IPSS 15  QOL 3\par SUSANNA NR\par Flow/PVR: machine malfunction, PVR 29ml\par \par 12/8/2021\par IPSS 19 QOL 6\par SUSANNA\par The PVR was 90 cc.  QMax 8 ml/s VV was 75. \par \par 11/15/2021 \par IPSS NR QOL NR\par SUSANNA NR- IPP \par \par Prostate cancer screening: the patient and I spoke at length about prostate cancer screening, its risks and its benefits. The patient has (Age, PSA) 2 risk factors for prostate cancer.  He understands that many men with prostate cancer will die with the disease rather than of it and we also discussed the results large multi-center American and  prostate cancer screening trials. He also understands that PSA in and of itself does not diagnose prostate cancer but only assesses risk to a certain degree. The patient understands that to definitively screen for prostate cancer, a biopsy is required and this procedure has risks, including bleeding, infection, ED and urinary retention. The patient opted to trend PSA yearly.\par \par The patient denies fevers, chills, nausea and or vomiting and no unexplained weight loss.\par \par All pertinent laboratory results and physician notes were reviewed.  Questionnaire results were discussed with patient.\par

## 2023-06-14 ENCOUNTER — NON-APPOINTMENT (OUTPATIENT)
Age: 78
End: 2023-06-14

## 2023-06-14 LAB
PSA FREE FLD-MCNC: 18 %
PSA FREE SERPL-MCNC: 0.2 NG/ML
PSA SERPL-MCNC: 1.13 NG/ML

## 2023-12-12 ENCOUNTER — APPOINTMENT (OUTPATIENT)
Dept: UROLOGY | Facility: CLINIC | Age: 78
End: 2023-12-12
Payer: MEDICARE

## 2023-12-12 DIAGNOSIS — R97.20 ELEVATED PROSTATE, SPECIFIC ANTIGEN [PSA]: ICD-10-CM

## 2023-12-12 DIAGNOSIS — N13.8 BENIGN PROSTATIC HYPERPLASIA WITH LOWER URINARY TRACT SYMPMS: ICD-10-CM

## 2023-12-12 DIAGNOSIS — N40.1 BENIGN PROSTATIC HYPERPLASIA WITH LOWER URINARY TRACT SYMPMS: ICD-10-CM

## 2023-12-12 PROCEDURE — 99213 OFFICE O/P EST LOW 20 MIN: CPT

## 2024-02-02 ENCOUNTER — APPOINTMENT (OUTPATIENT)
Dept: OPHTHALMOLOGY | Facility: CLINIC | Age: 79
End: 2024-02-02

## 2024-06-11 ENCOUNTER — APPOINTMENT (OUTPATIENT)
Dept: OPHTHALMOLOGY | Facility: CLINIC | Age: 79
End: 2024-06-11
Payer: MEDICARE

## 2024-06-11 ENCOUNTER — NON-APPOINTMENT (OUTPATIENT)
Age: 79
End: 2024-06-11

## 2024-06-11 PROCEDURE — 92083 EXTENDED VISUAL FIELD XM: CPT

## 2024-06-11 PROCEDURE — 92004 COMPRE OPH EXAM NEW PT 1/>: CPT

## 2024-06-11 PROCEDURE — 92020 GONIOSCOPY: CPT

## 2024-06-11 PROCEDURE — 76514 ECHO EXAM OF EYE THICKNESS: CPT

## 2024-06-11 PROCEDURE — 92250 FUNDUS PHOTOGRAPHY W/I&R: CPT

## 2024-06-17 ENCOUNTER — APPOINTMENT (OUTPATIENT)
Dept: DERMATOLOGY | Facility: CLINIC | Age: 79
End: 2024-06-17
Payer: MEDICARE

## 2024-06-17 VITALS — WEIGHT: 150 LBS | BODY MASS INDEX: 24.21 KG/M2

## 2024-06-17 DIAGNOSIS — L29.9 PRURITUS, UNSPECIFIED: ICD-10-CM

## 2024-06-17 DIAGNOSIS — L85.3 XEROSIS CUTIS: ICD-10-CM

## 2024-06-17 DIAGNOSIS — L30.9 DERMATITIS, UNSPECIFIED: ICD-10-CM

## 2024-06-17 DIAGNOSIS — L28.0 LICHEN SIMPLEX CHRONICUS: ICD-10-CM

## 2024-06-17 PROCEDURE — 99204 OFFICE O/P NEW MOD 45 MIN: CPT

## 2024-06-17 RX ORDER — CLOBETASOL PROPIONATE 0.5 MG/G
0.05 OINTMENT TOPICAL
Qty: 1 | Refills: 1 | Status: ACTIVE | COMMUNITY
Start: 2024-06-17 | End: 1900-01-01

## 2024-06-17 NOTE — PHYSICAL EXAM
[Alert] : alert [Oriented x 3] : ~L oriented x 3 [Declined] : declined [FreeTextEntry3] : thick oval scaly plaque on L lateral lower leg erythematous patches with erosions on L lower leg generalized xerosis faint excoriations on trunk groin clear dermatographism negative

## 2024-06-17 NOTE — HISTORY OF PRESENT ILLNESS
[FreeTextEntry1] : NPV - rash [de-identified] : 78 y/o M here for rash x1 year itchy all over his body, including groin, rash on groin is improved today no hives that come and go randomly, not sure what triggers it, feels like he wakes up with bumps on arms and itch is worse at night spraying his whole body with alcohol daily and takes bath 2-3x/day, uses cetaphil on L lower leg daily thicker lesion on leg was biopsied about 1.5 months ago on leg but doesn't know results, was told that they would call him if there is an issue and he never got a call was prescribed aug betamethasone but didn't pick it up because saw glaucoma because of side effects and he has glaucoma no PMH or diabetes or HTN seen PCP recently

## 2024-06-17 NOTE — ASSESSMENT
[FreeTextEntry1] : #dermatitis #generalized pruritus chronic, flaring likely irritant dermatitis 2/2 daily alcohol spray and frequent bathing L lower leg with features of #LSC vs psoriasis - discussed repeat biopsy, not necessarily needed today but encouraged pt to bring us copy of his results - start Clobetasol 0.05% ointment BID PRN 2 weeks on 1 week off to itchy areas only, SED such as skin thinning, telangiectasias, hypopigmentation; instructions discussed, pt verbalizes understanding - clobetasol under occlusion for L lateral leg - ordered CBC, cmp, tsh for generalized pruritus workup  #Xerosis cutis, generalized, chronic, 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 Avoid scrubbing/rubbing skin, no loofas or washcloths. Limit showers to once daily with lukewarm water - [place] use of bland emollients. List of recommended moisturizers provided  RTC 6 weeks room air

## 2024-06-19 LAB
ALBUMIN SERPL ELPH-MCNC: 4.5 G/DL
ALP BLD-CCNC: 64 U/L
ALT SERPL-CCNC: 31 U/L
ANION GAP SERPL CALC-SCNC: 12 MMOL/L
AST SERPL-CCNC: 29 U/L
BASOPHILS # BLD AUTO: 0.07 K/UL
BASOPHILS NFR BLD AUTO: 0.9 %
BILIRUB SERPL-MCNC: 1 MG/DL
BUN SERPL-MCNC: 19 MG/DL
CALCIUM SERPL-MCNC: 9.3 MG/DL
CHLORIDE SERPL-SCNC: 106 MMOL/L
CO2 SERPL-SCNC: 24 MMOL/L
CREAT SERPL-MCNC: 0.99 MG/DL
EGFR: 78 ML/MIN/1.73M2
EOSINOPHIL # BLD AUTO: 0.46 K/UL
EOSINOPHIL NFR BLD AUTO: 5.8 %
GLUCOSE SERPL-MCNC: 95 MG/DL
HCT VFR BLD CALC: 47.2 %
HGB BLD-MCNC: 15.5 G/DL
IMM GRANULOCYTES NFR BLD AUTO: 0.5 %
LYMPHOCYTES # BLD AUTO: 2.06 K/UL
LYMPHOCYTES NFR BLD AUTO: 25.8 %
MAN DIFF?: NORMAL
MCHC RBC-ENTMCNC: 29 PG
MCHC RBC-ENTMCNC: 32.8 GM/DL
MCV RBC AUTO: 88.2 FL
MONOCYTES # BLD AUTO: 0.71 K/UL
MONOCYTES NFR BLD AUTO: 8.9 %
NEUTROPHILS # BLD AUTO: 4.63 K/UL
NEUTROPHILS NFR BLD AUTO: 58.1 %
PLATELET # BLD AUTO: 221 K/UL
POTASSIUM SERPL-SCNC: 4.8 MMOL/L
PROT SERPL-MCNC: 7.1 G/DL
RBC # BLD: 5.35 M/UL
RBC # FLD: 13 %
SODIUM SERPL-SCNC: 142 MMOL/L
TSH SERPL-ACNC: 1.73 UIU/ML
WBC # FLD AUTO: 7.97 K/UL

## 2024-07-09 ENCOUNTER — APPOINTMENT (OUTPATIENT)
Dept: UROLOGY | Facility: CLINIC | Age: 79
End: 2024-07-09
Payer: MEDICARE

## 2024-07-09 DIAGNOSIS — N13.8 BENIGN PROSTATIC HYPERPLASIA WITH LOWER URINARY TRACT SYMPMS: ICD-10-CM

## 2024-07-09 DIAGNOSIS — N39.41 URGE INCONTINENCE: ICD-10-CM

## 2024-07-09 DIAGNOSIS — N40.1 BENIGN PROSTATIC HYPERPLASIA WITH LOWER URINARY TRACT SYMPMS: ICD-10-CM

## 2024-07-09 DIAGNOSIS — R97.20 ELEVATED PROSTATE, SPECIFIC ANTIGEN [PSA]: ICD-10-CM

## 2024-07-09 PROCEDURE — 99214 OFFICE O/P EST MOD 30 MIN: CPT

## 2024-07-09 RX ORDER — MIRABEGRON 50 MG/1
50 TABLET, EXTENDED RELEASE ORAL
Qty: 90 | Refills: 1 | Status: ACTIVE | COMMUNITY
Start: 2024-07-09 | End: 1900-01-01

## 2024-07-10 ENCOUNTER — NON-APPOINTMENT (OUTPATIENT)
Age: 79
End: 2024-07-10

## 2024-07-10 LAB
BACTERIA: NEGATIVE /HPF
BILIRUBIN URINE: NEGATIVE
BLOOD URINE: NEGATIVE
CAST: 1 /LPF
EPITHELIAL CELLS: 1 /HPF
GLUCOSE QUALITATIVE U: NEGATIVE MG/DL
KETONES URINE: NEGATIVE MG/DL
LEUKOCYTE ESTERASE URINE: ABNORMAL
MICROSCOPIC-UA: NORMAL
NITRITE URINE: NEGATIVE
PH URINE: 5.5
PROTEIN URINE: NORMAL MG/DL
PSA FREE FLD-MCNC: 18 %
PSA FREE SERPL-MCNC: 0.3 NG/ML
PSA SERPL-MCNC: 1.73 NG/ML
RED BLOOD CELLS URINE: 0 /HPF
SPECIFIC GRAVITY URINE: 1.02
UROBILINOGEN URINE: 1 MG/DL
WHITE BLOOD CELLS URINE: 2 /HPF

## 2024-08-05 ENCOUNTER — APPOINTMENT (OUTPATIENT)
Dept: DERMATOLOGY | Facility: CLINIC | Age: 79
End: 2024-08-05

## 2024-08-05 PROBLEM — L28.1 PRURIGO NODULARIS: Status: ACTIVE | Noted: 2024-08-05

## 2024-08-05 PROCEDURE — 99213 OFFICE O/P EST LOW 20 MIN: CPT | Mod: 25

## 2024-08-05 PROCEDURE — 11900 INJECT SKIN LESIONS </W 7: CPT

## 2024-08-05 NOTE — ASSESSMENT
[FreeTextEntry1] : #Prurigo nodularis, L lateral leg, chronic, stable - We have discussed the nature and course of this condition - We have discussed treatment options, expectations from treatments, and associated side effects of topical and intralesional therapies - C/w Clobetasol 0.05% ointment BID PRN 2 weeks on 1 week off to itchy areas only - SED such as skin thinning, telangiectasias, hypopigmentation; instructions discussed, pt verbalizes understanding - Intralesional injection of Kenalog 10 mg/ml was performed into L lateral leg prurigo nodule (superior) - a total of 0.1 ml was injected - The risks/benefits/alternatives of intralesional steroid injections were reviewed with the patient which include but are not limited to pain, atrophy, and dyspigmentation. Intralesional injections were performed only in the affected area. The patient tolerated the procedure well. - Lot #: [id number], Expiration date: 02/26  #Generalized pruritus, chronic, not at treatment goal - We have discussed the nature and course of this condition - We have discussed treatment options, expectations from treatments, and associated side effects of topical therapies - Reviewed CBC, CMP, TSH for generalized pruritus workup - WNL  - Pt will discuss with PCP if can change Crestor to another cholesterol medicine since pt believes it can be a potential trigger - Recommended Sarna Sensitive (OTC) prn itch - store in fridge for optimal relief on application   #Xerosis cutis, generalized, chronic, 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 Avoid scrubbing/rubbing skin, no loofas or washcloths. Limit showers to once daily with lukewarm water - [place] use of bland emollients. List of recommended moisturizers provided  RTC prn

## 2024-08-05 NOTE — HISTORY OF PRESENT ILLNESS
[FreeTextEntry1] : F/u generalized pruritus, prurigo nodularis  [de-identified] : 78 y/o M here for follow up of:  1) Generalized pruritus - Pruritus labs done at  (6/17/24) [facility] - Stopped using alcohol spray to body  - Still itchy after showers and at night  - Moisturizing with Cetaphil sometimes  - Pt believes Crestor is the culprit - started 2 months ago by PCP - reports improvement in itch when stopped the medicine for a brief period  2) Prurigo nodularis, L lateral leg - Biopsy-proven, 4/24 - pt provided biopsy report  - Used clobetasol prn on the area with some relief  - Reporting new similar bump below this one, also itchy at times

## 2024-08-05 NOTE — PHYSICAL EXAM
[Alert] : alert [Oriented x 3] : ~L oriented x 3 [Declined] : declined [FreeTextEntry3] : Notable skin findings included: - Thick oval scaly, hyperpigmented plaque on L lateral leg - Scaly erythematous papulonodule on L lateral leg, located inferior to above  - Generalized xerosis of trunk and extremities

## 2024-08-05 NOTE — HISTORY OF PRESENT ILLNESS
[FreeTextEntry1] : F/u generalized pruritus, prurigo nodularis  [de-identified] : 78 y/o M here for follow up of:  1) Generalized pruritus - Pruritus labs done at  (6/17/24) [facility] - Stopped using alcohol spray to body  - Still itchy after showers and at night  - Moisturizing with Cetaphil sometimes  - Pt believes Crestor is the culprit - started 2 months ago by PCP - reports improvement in itch when stopped the medicine for a brief period  2) Prurigo nodularis, L lateral leg - Biopsy-proven, 4/24 - pt provided biopsy report  - Used clobetasol prn on the area with some relief  - Reporting new similar bump below this one, also itchy at times

## 2024-08-05 NOTE — ASSESSMENT
[FreeTextEntry1] : #Prurigo nodularis, L lateral leg, chronic, stable - We have discussed the nature and course of this condition - We have discussed treatment options, expectations from treatments, and associated side effects of topical and intralesional therapies - C/w Clobetasol 0.05% ointment BID PRN 2 weeks on 1 week off to itchy areas only - SED such as skin thinning, telangiectasias, hypopigmentation; instructions discussed, pt verbalizes understanding - Intralesional injection of Kenalog 10 mg/ml was performed into L lateral leg prurigo nodule (superior) - a total of 0.1 ml was injected - The risks/benefits/alternatives of intralesional steroid injections were reviewed with the patient which include but are not limited to pain, atrophy, and dyspigmentation. Intralesional injections were performed only in the affected area. The patient tolerated the procedure well. - Lot #: [id number], Expiration date: 02/26  #Generalized pruritus, chronic, not at treatment goal - We have discussed the nature and course of this condition - We have discussed treatment options, expectations from treatments, and associated side effects of topical therapies - Reviewed CBC, CMP, TSH for generalized pruritus workup - WNL  - Pt will discuss with PCP if can change Crestor to another cholesterol medicine since pt believes it can be a potential trigger - Recommended Sarna Sensitive (OTC) prn itch - store in fridge for optimal relief on application   #Xerosis cutis, generalized, chronic, 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 Avoid scrubbing/rubbing skin, no loofas or washcloths. Limit showers to once daily with lukewarm water - [place]ti use of bland emollients. List of recommended moisturizers provided  RTC prn

## 2024-11-25 ENCOUNTER — APPOINTMENT (OUTPATIENT)
Dept: OPHTHALMOLOGY | Facility: CLINIC | Age: 79
End: 2024-11-25

## 2025-01-14 ENCOUNTER — NON-APPOINTMENT (OUTPATIENT)
Age: 80
End: 2025-01-14

## 2025-01-14 ENCOUNTER — APPOINTMENT (OUTPATIENT)
Dept: UROLOGY | Facility: CLINIC | Age: 80
End: 2025-01-14
Payer: MEDICARE

## 2025-01-14 DIAGNOSIS — N13.8 BENIGN PROSTATIC HYPERPLASIA WITH LOWER URINARY TRACT SYMPMS: ICD-10-CM

## 2025-01-14 DIAGNOSIS — R68.82 DECREASED LIBIDO: ICD-10-CM

## 2025-01-14 DIAGNOSIS — N40.1 BENIGN PROSTATIC HYPERPLASIA WITH LOWER URINARY TRACT SYMPMS: ICD-10-CM

## 2025-01-14 DIAGNOSIS — R97.20 ELEVATED PROSTATE, SPECIFIC ANTIGEN [PSA]: ICD-10-CM

## 2025-01-14 PROCEDURE — 99214 OFFICE O/P EST MOD 30 MIN: CPT

## 2025-01-14 PROCEDURE — G2211 COMPLEX E/M VISIT ADD ON: CPT

## 2025-01-17 LAB
PSA FREE FLD-MCNC: 32 %
PSA FREE SERPL-MCNC: 0.61 NG/ML
PSA SERPL-MCNC: 1.93 NG/ML
TESTOST SERPL-MCNC: 544 NG/DL

## 2025-08-12 ENCOUNTER — APPOINTMENT (OUTPATIENT)
Dept: UROLOGY | Facility: CLINIC | Age: 80
End: 2025-08-12
Payer: MEDICARE

## 2025-08-12 ENCOUNTER — NON-APPOINTMENT (OUTPATIENT)
Age: 80
End: 2025-08-12

## 2025-08-12 VITALS
OXYGEN SATURATION: 96 % | SYSTOLIC BLOOD PRESSURE: 124 MMHG | HEART RATE: 53 BPM | RESPIRATION RATE: 16 BRPM | HEIGHT: 66 IN | TEMPERATURE: 98.3 F | BODY MASS INDEX: 24.11 KG/M2 | DIASTOLIC BLOOD PRESSURE: 78 MMHG | WEIGHT: 150 LBS

## 2025-08-12 DIAGNOSIS — N13.8 BENIGN PROSTATIC HYPERPLASIA WITH LOWER URINARY TRACT SYMPMS: ICD-10-CM

## 2025-08-12 DIAGNOSIS — R97.20 ELEVATED PROSTATE, SPECIFIC ANTIGEN [PSA]: ICD-10-CM

## 2025-08-12 DIAGNOSIS — N40.1 BENIGN PROSTATIC HYPERPLASIA WITH LOWER URINARY TRACT SYMPMS: ICD-10-CM

## 2025-08-12 PROCEDURE — G2211 COMPLEX E/M VISIT ADD ON: CPT

## 2025-08-12 PROCEDURE — 99213 OFFICE O/P EST LOW 20 MIN: CPT

## 2025-08-12 PROCEDURE — 51798 US URINE CAPACITY MEASURE: CPT
